# Patient Record
Sex: MALE | Race: ASIAN | NOT HISPANIC OR LATINO | URBAN - METROPOLITAN AREA
[De-identification: names, ages, dates, MRNs, and addresses within clinical notes are randomized per-mention and may not be internally consistent; named-entity substitution may affect disease eponyms.]

---

## 2023-02-28 ENCOUNTER — APPOINTMENT (RX ONLY)
Dept: URBAN - METROPOLITAN AREA CLINIC 45 | Facility: CLINIC | Age: 38
Setting detail: DERMATOLOGY
End: 2023-02-28

## 2023-02-28 DIAGNOSIS — L663 OTHER SPECIFIED DISEASES OF HAIR AND HAIR FOLLICLES: ICD-10-CM

## 2023-02-28 DIAGNOSIS — L90.5 SCAR CONDITIONS AND FIBROSIS OF SKIN: ICD-10-CM

## 2023-02-28 DIAGNOSIS — L73.9 FOLLICULAR DISORDER, UNSPECIFIED: ICD-10-CM

## 2023-02-28 DIAGNOSIS — L91.8 OTHER HYPERTROPHIC DISORDERS OF THE SKIN: ICD-10-CM

## 2023-02-28 DIAGNOSIS — Z41.9 ENCOUNTER FOR PROCEDURE FOR PURPOSES OTHER THAN REMEDYING HEALTH STATE, UNSPECIFIED: ICD-10-CM

## 2023-02-28 DIAGNOSIS — L72.8 OTHER FOLLICULAR CYSTS OF THE SKIN AND SUBCUTANEOUS TISSUE: ICD-10-CM

## 2023-02-28 DIAGNOSIS — L738 OTHER SPECIFIED DISEASES OF HAIR AND HAIR FOLLICLES: ICD-10-CM

## 2023-02-28 DIAGNOSIS — L70.0 ACNE VULGARIS: ICD-10-CM | Status: INADEQUATELY CONTROLLED

## 2023-02-28 PROBLEM — L02.12 FURUNCLE OF NECK: Status: ACTIVE | Noted: 2023-02-28

## 2023-02-28 PROCEDURE — ? DEFER

## 2023-02-28 PROCEDURE — ? PRESCRIPTION MEDICATION MANAGEMENT

## 2023-02-28 PROCEDURE — ? PRESCRIPTION

## 2023-02-28 PROCEDURE — ? ADDITIONAL NOTES

## 2023-02-28 PROCEDURE — ? FULL BODY SKIN EXAM - DECLINED

## 2023-02-28 PROCEDURE — ? COUNSELING

## 2023-02-28 PROCEDURE — ? COSMETIC QUOTE

## 2023-02-28 PROCEDURE — 99204 OFFICE O/P NEW MOD 45 MIN: CPT

## 2023-02-28 RX ORDER — ADAPALENE 3 MG/G
GEL TOPICAL QHS
Qty: 45 | Refills: 4 | Status: ERX | COMMUNITY
Start: 2023-02-28

## 2023-02-28 RX ORDER — CLINDAMYCIN PHOSPHATE 10 MG/ML
SOLUTION TOPICAL QAM
Qty: 60 | Refills: 8 | Status: ERX | COMMUNITY
Start: 2023-02-28

## 2023-02-28 RX ADMIN — CLINDAMYCIN PHOSPHATE: 10 SOLUTION TOPICAL at 00:00

## 2023-02-28 RX ADMIN — ADAPALENE: 3 GEL TOPICAL at 00:00

## 2023-02-28 ASSESSMENT — LOCATION SIMPLE DESCRIPTION DERM
LOCATION SIMPLE: POSTERIOR SCALP
LOCATION SIMPLE: RIGHT ANTERIOR NECK
LOCATION SIMPLE: LEFT CHEEK
LOCATION SIMPLE: RIGHT CHEEK
LOCATION SIMPLE: POSTERIOR NECK
LOCATION SIMPLE: LEFT CHEEK

## 2023-02-28 ASSESSMENT — LOCATION DETAILED DESCRIPTION DERM
LOCATION DETAILED: RIGHT SUPERIOR ANTERIOR NECK
LOCATION DETAILED: LEFT INFERIOR CENTRAL MALAR CHEEK
LOCATION DETAILED: MID POSTERIOR NECK
LOCATION DETAILED: LEFT CENTRAL MALAR CHEEK
LOCATION DETAILED: RIGHT CENTRAL MALAR CHEEK
LOCATION DETAILED: RIGHT OCCIPITAL SCALP

## 2023-02-28 ASSESSMENT — LOCATION ZONE DERM
LOCATION ZONE: FACE
LOCATION ZONE: SCALP
LOCATION ZONE: FACE
LOCATION ZONE: NECK

## 2023-02-28 NOTE — PROCEDURE: PRESCRIPTION MEDICATION MANAGEMENT
Detail Level: Zone
Initiate Treatment: Clindamycin 1% swabs to scalp qd
Render In Strict Bullet Format?: No

## 2023-02-28 NOTE — PROCEDURE: COSMETIC QUOTE
Assistance requested in scheduling NP with Dr. Cabrera in February as a transfer from Dr. Clark.   
Include Sales Tax On Implants: Yes
Body Procedure 7 Units: 0
Breast Procedure 7 Free Text Discount (In Dollars- Use Only Numbers And Decimals): 0.00
Body Procedure 4: Add on extractions
Laser 6 Price/Unit (In Dollars- Use Only Numbers And Decimals): 199.00
Body Procedure 1: Keravive
Injectable 6 Price/Unit (In Dollars- Use Only Numbers And Decimals): 700.00
Laser 13: Laser Hair Removal Under Arms
Face Procedure 4 Price/Unit (In Dollars- Use Only Numbers And Decimals): 450.00
Laser 3 Price/Unit (In Dollars- Use Only Numbers And Decimals): 675.00
Face Procedure 8 Price/Unit (In Dollars- Use Only Numbers And Decimals): 299.00
Include Sales Tax On Facility Fees: No
Face Procedure 1 Price/Unit (In Dollars- Use Only Numbers And Decimals): 429.00
Laser 7: Laser Hair Removal Beard Outline
Laser 1 Price/Unit (In Dollars- Use Only Numbers And Decimals): 200.00
Injectable 3 Dill/Unit (In Dollars- Use Only Numbers And Decimals): 15.00
Face Procedure 9: IPL Spot Treatment
Laser 20 Price/Unit (In Dollars- Use Only Numbers And Decimals): 379.00
Injectable 7: Lip filler
Laser 4: Laser Hair Removal 1/2 Leg
Body Procedure 8 Price/Unit (In Dollars- Use Only Numbers And Decimals): 400.00
Face Procedure 5: Viva Add on Neck
Face Procedure 2: coolpeel laser around mouth
Body Procedure 5 Price/Unit (In Dollars- Use Only Numbers And Decimals): 350.00
Body Procedure 2 Price/Unit (In Dollars- Use Only Numbers And Decimals): 800.00
Injectable 4: Facial Fillers (Defyne)
Number Of Months: 1
Laser 2: Laser Hair Removal Full Body
Laser 8: CoolPeel Full face
Laser 2 Price/Unit (In Dollars- Use Only Numbers And Decimals): 1000.00
Face Procedure 6: V-Beam Laser
Body Procedure 9 Price/Unit (In Dollars- Use Only Numbers And Decimals): 899.00
Face Procedure 10: SkinPen micronedling
Laser 5: Laser Hair Removal Brazilian
Face Procedure 3: Viva Full Face
Face Procedure 10 Units: 7
Body Procedure 6 Price/Unit (In Dollars- Use Only Numbers And Decimals): 900.00
Body Procedure 3 Price/Unit (In Dollars- Use Only Numbers And Decimals): 100-200
Laser 15 Price/Unit (In Dollars- Use Only Numbers And Decimals): 499.00
Injectable 5: Voluma
Face Procedure 3 Units: 5
Injectable 2: Botox
Body Procedure 7: Maribell Lau
Laser 19: Laser Hair Removal Full Back
Laser 16: Laser Hair Removal Chest
Face Procedure 4: IPL laser
Laser 3: Laser Hair Removal Full Legs
Body Procedure 7 Price/Unit (In Dollars- Use Only Numbers And Decimals): 1400.00
Face Procedure 1: VI Precision plus peel
Body Procedure 4 Price/Unit (In Dollars- Use Only Numbers And Decimals): 75.00
Injectable 6: Juvederm Ultra XC (lips)
Body Procedure 1 Price/Unit (In Dollars- Use Only Numbers And Decimals): 575.00
Laser 1: Add on IPL
Injectable 3: Botox (DOA)
Body Procedure 8: Maribell Lau #2
Laser 20: Laser Hair Removal Buttocks
Laser 17: Laser Hair Removal Abdomen
Body Procedure 5: Viva Around mouth only
Laser 7 Price/Unit (In Dollars- Use Only Numbers And Decimals): 99.00
Body Procedure 2: Viva with IPL
Laser 4 Price/Unit (In Dollars- Use Only Numbers And Decimals): 475.00
Laser 14: Laser Hair Removal 1/2 Arms
Laser 11: Laser Hair Removal Lip/Chin
Body Procedure 9: Scalp PRP
Injectable 1: Botox (eyes)
Body Procedure 6: CoolPeel
Laser 18: Laser Hair Removal 1/2 Back
Body Procedure 3: Add on Viva abdominal Scar
Laser 5 Price/Unit (In Dollars- Use Only Numbers And Decimals): 399.00
Laser 15: Laser Hair Removal Full Arms
Face Procedure 3 Price/Unit (In Dollars- Use Only Numbers And Decimals): 550.00
Laser 12: Laser hair Removal Full Face/ Beard
Face Procedure 7 Price/Unit (In Dollars- Use Only Numbers And Decimals): 100.00
Face Procedure 7: Add on V-Beam veins
Laser 6: Laser Hair Removal Bikini
Detail Level: Zone
Face Procedure 8: Hydrafacial Deluxe
Laser 19 Price/Unit (In Dollars- Use Only Numbers And Decimals): 500.00

## 2023-02-28 NOTE — PROCEDURE: DEFER
Reason To Defer Override: Refer to [facility] or [facility] for laser or microneedling consultation
Introduction Text (Please End With A Colon): The following procedure was deferred:
Detail Level: Detailed
X Size Of Lesion In Cm (Optional): 0

## 2023-03-07 ENCOUNTER — RX ONLY (OUTPATIENT)
Age: 38
Setting detail: RX ONLY
End: 2023-03-07

## 2023-03-07 RX ORDER — HYDROCORTISONE 25 MG/G
CREAM TOPICAL
Qty: 30 | Refills: 0 | Status: ERX | COMMUNITY
Start: 2023-03-07